# Patient Record
Sex: FEMALE | Employment: FULL TIME | URBAN - METROPOLITAN AREA
[De-identification: names, ages, dates, MRNs, and addresses within clinical notes are randomized per-mention and may not be internally consistent; named-entity substitution may affect disease eponyms.]

---

## 2021-10-11 ENCOUNTER — APPOINTMENT (OUTPATIENT)
Dept: PHYSICAL THERAPY | Age: 46
End: 2021-10-11
Attending: ORTHOPAEDIC SURGERY

## 2021-10-19 ENCOUNTER — TRANSCRIBE ORDER (OUTPATIENT)
Dept: SCHEDULING | Age: 46
End: 2021-10-19

## 2021-10-19 DIAGNOSIS — Z12.31 ENCOUNTER FOR SCREENING MAMMOGRAM FOR MALIGNANT NEOPLASM OF BREAST: Primary | ICD-10-CM

## 2023-07-11 ENCOUNTER — OFFICE VISIT (OUTPATIENT)
Dept: ORTHOPEDIC SURGERY | Age: 48
End: 2023-07-11

## 2023-07-11 VITALS — WEIGHT: 173 LBS | HEIGHT: 64 IN | BODY MASS INDEX: 29.53 KG/M2

## 2023-07-11 DIAGNOSIS — M25.859 OTHER SPECIFIED JOINT DISORDERS, UNSPECIFIED HIP: ICD-10-CM

## 2023-07-11 DIAGNOSIS — M25.551 PAIN IN RIGHT HIP: Primary | ICD-10-CM

## 2023-07-11 RX ORDER — TRIAMCINOLONE ACETONIDE 40 MG/ML
40 INJECTION, SUSPENSION INTRA-ARTICULAR; INTRAMUSCULAR ONCE
Status: COMPLETED | OUTPATIENT
Start: 2023-07-11 | End: 2023-07-11

## 2023-07-11 RX ADMIN — TRIAMCINOLONE ACETONIDE 40 MG: 40 INJECTION, SUSPENSION INTRA-ARTICULAR; INTRAMUSCULAR at 15:07

## 2023-07-11 NOTE — PROGRESS NOTES
Name: Ruperto Kawasaki  YOB: 1975  Gender: female  MRN: [id number]      CC: Hip Pain (R)       HPI: Ruperto Kawasaki is a 46 y.o. female who returns for follow up on  her right hip. She was last seen in September of 2021. She responded well to an intra-articular ultrasound injection and would like another today. Physical Examination:  General: no acute distress  Lungs: breathing easily  CV: regular rhythm by pulse  Right Hip: Pain with logroll internal/external rotation. Pain with Stinchfield. Assessment:     ICD-10-CM    1. Pain in right hip  M25.551 US ARTHR/ASP/INJ MAJOR JNT/BURSA RIGHT     triamcinolone acetonide (KENALOG-40) injection 40 mg      2. Other specified joint disorders, unspecified hip  M25.859           Plan:   She had a good response to an intra-articular injection before she requested a repeat one today we also talked about home exercise strengthening program.  After verbal informed consent the Right hip was injected intra-articularly under ultrasound guidance with the curvilinear probe n the longitudinal axis from an anterolateral approach. 3 cc of 1% lidocaine was used in the skin down to the capsule until the capsule was seen to clearly distend with the needle in the femoral head neck junction off the articular surface. We then injected 4 cc of 0.25% Marcaine and 1 cc of 40 mg Kenalog intra-articularly the capsule was seen to clearly distend. The images were saved to the ultrasound machine. The patient tolerated the procedure well and was given postinjection flare precautions. Nikko Mckeon MD, [address] [facility]

## 2024-12-12 ENCOUNTER — OFFICE VISIT (OUTPATIENT)
Dept: ORTHOPEDIC SURGERY | Age: 49
End: 2024-12-12

## 2024-12-12 DIAGNOSIS — M25.551 PAIN IN RIGHT HIP: Primary | ICD-10-CM

## 2024-12-12 RX ORDER — TRIAMCINOLONE ACETONIDE 40 MG/ML
40 INJECTION, SUSPENSION INTRA-ARTICULAR; INTRAMUSCULAR ONCE
Status: COMPLETED | OUTPATIENT
Start: 2024-12-12 | End: 2024-12-12

## 2024-12-12 RX ADMIN — TRIAMCINOLONE ACETONIDE 40 MG: 40 INJECTION, SUSPENSION INTRA-ARTICULAR; INTRAMUSCULAR at 14:50

## 2024-12-12 NOTE — PROGRESS NOTES
Name: Tami Weeks  YOB: 1975  Gender: female  MRN: [id number]      CC: Hip Pain (R)       HPI: Tami Weeks is a 49 y.o. female who returns for follow up on the right hip.  She is requesting a repeat injection into the right hip today.  Her last injection was in July 2023, she states it was helpful for a few months but her pain has gradually returned.  She states the second injection was not as helpful as the first injection.  She did get new x-rays today since it been over 3 years since her last.        Physical Examination:  General: no acute distress  Lungs: breathing easily  CV: regular rhythm by pulse  Right Hip: Pain with logroll internal/external rotation pain with impingement testing MARCIO is more painful than FADIR.  Pain with Stinchfield.  Soft tissue tenderness around the hip    Hip/pelvis XR: 2 views     Clinical Indication  1. Pain in right hip           Report: AP pelvis and frog lateral x-ray of the Right hip demonstrates coxa profunda morphology with crossover sign of the acetabulum prominence of the head neck junction without significant progression or interval change from previous comparison film 9/2021    Impression: STIVEN as above, no acute findings                  Assessment:     ICD-10-CM    1. Pain in right hip  M25.551 XR HIP 2-3 VW W PELVIS RIGHT     US ARTHR/ASP/INJ MAJOR JNT/BURSA RIGHT     triamcinolone acetonide (KENALOG-40) injection 40 mg          Plan:   Recurrent right hip pain she does have some mild STIVEN and degenerative changes a lot of her symptoms are extra-articular and muscular.  However she does have an intra-articular source of the pain.  She had good response to an intra-articular injection before we discussed pros and cons of repeating that today which she elected proceed with.  We also taught her home exercise glutes strengthening and hip flexor stretching protocol.    Before beginning this procedure, the procedure was explained in detail. The patient's